# Patient Record
Sex: FEMALE | Race: WHITE
[De-identification: names, ages, dates, MRNs, and addresses within clinical notes are randomized per-mention and may not be internally consistent; named-entity substitution may affect disease eponyms.]

---

## 2017-01-19 ENCOUNTER — HOSPITAL ENCOUNTER (EMERGENCY)
Dept: HOSPITAL 25 - UCCORT | Age: 42
Discharge: HOME | End: 2017-01-19
Payer: COMMERCIAL

## 2017-01-19 VITALS — DIASTOLIC BLOOD PRESSURE: 77 MMHG | SYSTOLIC BLOOD PRESSURE: 109 MMHG

## 2017-01-19 DIAGNOSIS — Z87.440: Primary | ICD-10-CM

## 2017-01-19 DIAGNOSIS — Z88.0: ICD-10-CM

## 2017-01-19 PROCEDURE — 87086 URINE CULTURE/COLONY COUNT: CPT

## 2017-01-19 PROCEDURE — 99212 OFFICE O/P EST SF 10 MIN: CPT

## 2017-01-19 PROCEDURE — 87077 CULTURE AEROBIC IDENTIFY: CPT

## 2017-01-19 PROCEDURE — G0463 HOSPITAL OUTPT CLINIC VISIT: HCPCS

## 2017-01-19 PROCEDURE — 87798 DETECT AGENT NOS DNA AMP: CPT

## 2017-01-19 PROCEDURE — 87186 SC STD MICRODIL/AGAR DIL: CPT

## 2017-01-19 NOTE — UC
Complaint Female HPI





- HPI Summary


HPI Summary: 





dysuria, inc frequency, urgency, suprapubic pressure for 3d. Intolerable today. 

No vomiting or fever. Feels like prior UTI





- History Of Current Complaint


Chief Complaint: UCGU


Stated Complaint: UTI COMPLAINT


Time Seen by Provider: 01/19/17 11:26


Hx Obtained From: Patient


Hx Last Menstrual Period: 1/9/18


Onset/Duration: Gradual Onset, Lasting Days - 3


Timing: Constant


Severity Initially: Mild


Severity Currently: Moderate


Character: Sharp, Cramping


Aggravating Factor(s): Urination


Alleviating Factor(s): Nothing


Associated Signs And Symptoms: Positive: Negative


Related Hx: Similar Episode/Dx as: - UTI





- Risk Factors


Ectopic Pregnancy Risk Factor: Negative


Ovarian Torsion Risk Factor: Negative





- Allergies/Home Medications


Allergies/Adverse Reactions: 


 Allergies











Allergy/AdvReac Type Severity Reaction Status Date / Time


 


Amoxicillin Allergy Intermediate Rash Verified 01/19/17 11:27











Home Medications: 


 Home Medications





Cranberry-Vitamin C [Azo Cranbery Urinary Trac 250-60 mg] 1 cap PO BID PRN 01/19 /17 [History Confirmed 01/19/17]


Diphenhydramine HCl [Benadryl Allergy] 50 mg PO DAILY PRN 01/19/17 [History 

Confirmed 01/19/17]


Magnesium 500 mg PO DAILY 01/19/17 [History Confirmed 01/19/17]











PMH/Surg Hx/FS Hx/Imm Hx


Previously Healthy: Yes


Endocrine History Of: 


   Denies: Diabetes


Cardiovascular History Of: 


   Denies: Hypertension, Pacemaker/ICD


Respiratory History Of: 


   Denies: Asthma





- Surgical History


Surgical History: None





- Family History


Known Family History: 


   Negative: Seizure Disorder, Blood Disorder





- Social History


Occupation: Employed Full-time - health teacher


Lives: With Family


Alcohol Use: Daily


Alcohol Amount: wine


Substance Use Type: None


Smoking Status (MU): Never Smoked Tobacco





- Immunization History


Most Recent Tetanus Shot: within past 5 years





Review of Systems


Constitutional: Negative


Skin: Negative


Eyes: Negative


ENT: Negative


Respiratory: Negative


Cardiovascular: Negative


Gastrointestinal: Negative


Genitourinary: Dysuria, Frequency, Urgency


Motor: Negative


Neurovascular: Negative


Musculoskeletal: Negative


Neurological: Negative


Psychological: Negative


All Other Systems Reviewed And Are Negative: Yes





Physical Exam


Triage Information Reviewed: Yes


Appearance: Well-Appearing, No Pain Distress, Well-Nourished


Vital Signs: 


 Initial Vital Signs











Temp  98.2 F   01/19/17 11:29


 


Pulse  86   01/19/17 11:29


 


Resp  16   01/19/17 11:29


 


BP  109/77   01/19/17 11:29


 


Pulse Ox  98   01/19/17 11:29











Vital Signs Reviewed: Yes


Eye Exam: Normal


Neck exam: Normal


Respiratory Exam: Normal


Cardiovascular Exam: Normal


Abdomen Description: Positive: Other: - suprapubic tenderness


Musculoskeletal Exam: Normal


Neurological Exam: Normal


Psychological Exam: Normal


Skin Exam: Normal





Diagnostics





- Laboratory


Diagnostic Studies Completed/Ordered: U/A dip positive leuks





 Complaint Female Dx





- Differential Dx/Diagnosis


Differential Diagnosis/HQI/PQRI: Urinary Tract Infection


Provider Diagnoses: UTI





Discharge





- Discharge Plan


Condition: Stable


Disposition: HOME


Prescriptions: 


DOXYcycline CAP(*) [DOXYcycline 100MG CAP(*)] 100 mg PO BID #20 cap


Phenazopyridine HCl [Pyridium] 200 mg PO TID PRN #6 tab


 PRN Reason: pain with urination


Patient Education Materials:  Urinary Tract Infection in Women (ED)


Referrals: 


No Primary Care Phys,NOPCP [Primary Care Provider] -

## 2017-01-23 LAB
SPECIMEN SOURCE: (no result)
SPECIMEN SOURCE: (no result)

## 2017-08-17 ENCOUNTER — HOSPITAL ENCOUNTER (EMERGENCY)
Dept: HOSPITAL 25 - UCCORT | Age: 42
Discharge: HOME | End: 2017-08-17
Payer: COMMERCIAL

## 2017-08-17 VITALS — DIASTOLIC BLOOD PRESSURE: 85 MMHG | SYSTOLIC BLOOD PRESSURE: 125 MMHG

## 2017-08-17 DIAGNOSIS — N39.0: Primary | ICD-10-CM

## 2017-08-17 DIAGNOSIS — Z32.02: ICD-10-CM

## 2017-08-17 DIAGNOSIS — Z88.1: ICD-10-CM

## 2017-08-17 PROCEDURE — 81003 URINALYSIS AUTO W/O SCOPE: CPT

## 2017-08-17 PROCEDURE — 99212 OFFICE O/P EST SF 10 MIN: CPT

## 2017-08-17 PROCEDURE — 87086 URINE CULTURE/COLONY COUNT: CPT

## 2017-08-17 PROCEDURE — 84702 CHORIONIC GONADOTROPIN TEST: CPT

## 2017-08-17 PROCEDURE — G0463 HOSPITAL OUTPT CLINIC VISIT: HCPCS

## 2017-08-17 NOTE — UC
Complaint Female HPI





- HPI Summary


HPI Summary: 





FREQUENCY BURNING PRESSURE AND URGENCY WITH URINATION SINCE THIS MORNING. NO 

BACK PAIN. NO FEVER. 





- History Of Current Complaint


Chief Complaint: UCGU


Stated Complaint: URINARY COMPLAINT


Time Seen by Provider: 08/17/17 10:37


Hx Obtained From: Patient


Hx Last Menstrual Period: 7/25/17


Onset/Duration: Gradual Onset, Lasting Days, Still Present


Timing: Intermittent


Severity Initially: Mild


Severity Currently: Mild


Pain Intensity: 0


Pain Scale Used: 0-10 Numeric


Character: Burning


Aggravating Factor(s): Urination


Associated Signs And Symptoms: Negative: Fever, Back Pain, Vaginal Bleeding/

Discharge, Vaginal Discharge, Nausea, Vomiting(# Of Episodes =)





- Risk Factors


Ectopic Pregnancy Risk Factor: Negative


Ovarian Torsion Risk Factor: Negative





- Allergies/Home Medications


Allergies/Adverse Reactions: 


 Allergies











Allergy/AdvReac Type Severity Reaction Status Date / Time


 


Amoxicillin Allergy Intermediate Rash Verified 08/17/17 09:41














PMH/Surg Hx/FS Hx/Imm Hx


Previously Healthy: Yes





- Surgical History


Surgical History: None





- Family History


Known Family History: 


   Negative: Renal Disease, Seizure Disorder, Blood Disorder





- Social History


Occupation: Employed Full-time


Lives: With Family


Alcohol Use: Daily


Alcohol Amount: wine


Substance Use Type: None


Smoking Status (MU): Never Smoked Tobacco





- Immunization History


Most Recent Tetanus Shot: within past 5 years





Review of Systems


Constitutional: Negative


Skin: Negative


Eyes: Negative


ENT: Negative


Respiratory: Negative


Cardiovascular: Negative


Gastrointestinal: Negative


Genitourinary: Dysuria, Frequency, Urgency


Motor: Negative


Neurovascular: Negative


Musculoskeletal: Negative


Neurological: Negative


Psychological: Negative


All Other Systems Reviewed And Are Negative: Yes





Physical Exam


Triage Information Reviewed: Yes


Appearance: Well-Appearing, No Pain Distress, Well-Nourished


Vital Signs: 


 Initial Vital Signs











Temp  97.9 F   08/17/17 09:43


 


Pulse  76   08/17/17 09:43


 


Resp  18   08/17/17 09:43


 


BP  125/85   08/17/17 09:43


 


Pulse Ox  100   08/17/17 09:43











Vital Signs Reviewed: Yes


Eye Exam: Normal


ENT Exam: Normal


ENT: Positive: Normal ENT inspection, Hearing grossly normal, TMs normal


Dental Exam: Normal


Neck exam: Normal


Neck: Positive: Supple, Nontender, No Lymphadenopathy


Respiratory Exam: Normal


Respiratory: Positive: Chest non-tender, Lungs clear, Normal breath sounds, No 

respiratory distress, No accessory muscle use


Cardiovascular Exam: Normal


Cardiovascular: Positive: RRR, No Murmur, Pulses Normal


Abdominal Exam: Normal


Abdomen Description: Positive: Nontender, No Organomegaly


Musculoskeletal Exam: Normal


Musculoskeletal: Positive: Strength Intact, ROM Intact


Neurological Exam: Normal


Psychological Exam: Normal


Skin Exam: Normal





 Complaint Female Dx





- Differential Dx/Diagnosis


Differential Diagnosis/HQI/PQRI: Cervicitis, Renal Colic, Urinary Tract 

Infection


Provider Diagnoses: URINARY TRACT INFECTION





Discharge





- Discharge Plan


Condition: Stable


Disposition: HOME


Prescriptions: 


Phenazopyridine TAB* [Pyridium 100 mg TAB*] 100 mg PO TID PRN #15 tab


 PRN Reason: Pain


Sulfamethox/Trimethoprim DS* [Bactrim /160 TAB*] 1 tab PO BID #10 tab


Patient Education Materials:  Urinary Tract Infection in Women (ED)


Referrals: 


Theodore RUFF,Patricia DOSS [Primary Care Provider] -

## 2017-08-19 NOTE — UC
Progress





- Progress Note


Progress Note: 


urine cx shows few enterbacteria, possible contamination. there is not 

significant grwoth to show infection. stop abx, she was put on bactrim.

## 2018-02-06 ENCOUNTER — HOSPITAL ENCOUNTER (EMERGENCY)
Dept: HOSPITAL 25 - UCCORT | Age: 43
Discharge: HOME | End: 2018-02-06
Payer: COMMERCIAL

## 2018-02-06 VITALS — DIASTOLIC BLOOD PRESSURE: 85 MMHG | SYSTOLIC BLOOD PRESSURE: 129 MMHG

## 2018-02-06 DIAGNOSIS — N39.0: Primary | ICD-10-CM

## 2018-02-06 DIAGNOSIS — Z32.02: ICD-10-CM

## 2018-02-06 DIAGNOSIS — B96.20: ICD-10-CM

## 2018-02-06 DIAGNOSIS — Z87.898: ICD-10-CM

## 2018-02-06 PROCEDURE — 87186 SC STD MICRODIL/AGAR DIL: CPT

## 2018-02-06 PROCEDURE — 99212 OFFICE O/P EST SF 10 MIN: CPT

## 2018-02-06 PROCEDURE — 87077 CULTURE AEROBIC IDENTIFY: CPT

## 2018-02-06 PROCEDURE — 81003 URINALYSIS AUTO W/O SCOPE: CPT

## 2018-02-06 PROCEDURE — 84702 CHORIONIC GONADOTROPIN TEST: CPT

## 2018-02-06 PROCEDURE — G0463 HOSPITAL OUTPT CLINIC VISIT: HCPCS

## 2018-02-06 PROCEDURE — 87086 URINE CULTURE/COLONY COUNT: CPT

## 2018-02-06 NOTE — UC
Complaint Female HPI





- HPI Summary


HPI Summary: 





41 y/o female presents to the urgent care c/o frequency and burning on 

urinations for the past 24 hours. Pt states recurrent UTI last year. Pt took 1 

tab of Azo this morning. Pt denies pelvic pain, lower back pain, vaginal 

discharge, fever, abdominal pain, N/V/D. Hx of uterine ablation in 6/2017. 








- History Of Current Complaint


Hx Obtained From: Patient


Hx Last Menstrual Period: 7/25/17


Onset/Duration: Gradual Onset, Lasting Days - 1 day, Still Present, Worse Since 

- this morning


Timing: Intermittent


Severity Initially: Mild


Severity Currently: Mild


Pain Intensity: 1


Pain Scale Used: 0-10 Numeric


Character: Burning


Aggravating Factor(s): Urination


Alleviating Factor(s): Nothing


Associated Signs And Symptoms: Positive: Negative.  Negative: Fever, Back Pain, 

Vaginal Bleeding/Discharge, Genital Swelling





- Risk Factors


Ectopic Pregnancy Risk Factor: Negative


Ovarian Torsion Risk Factor: Negative





<Jasmin Faith - Last Filed: 02/07/18 18:00>





<Blanca Sullivan - Last Filed: 02/08/18 12:39>





- History Of Current Complaint


Chief Complaint: UCGU


Stated Complaint: URINARY


Time Seen by Provider: 02/06/18 15:06





- Allergies/Home Medications


Allergies/Adverse Reactions: 


 Allergies











Allergy/AdvReac Type Severity Reaction Status Date / Time


 


MS Amoxicillin [Amoxicillin] Allergy Intermediate Rash Verified 02/06/18 14:54














PMH/Surg Hx/FS Hx/Imm Hx


Previously Healthy: Yes - Pt denies PMHX





- Surgical History


Surgical History: None





- Family History


Known Family History: 


   Negative: Renal Disease, Seizure Disorder, Blood Disorder





- Social History


Alcohol Use: Daily


Alcohol Amount: wine


Substance Use Type: None


Smoking Status (MU): Never Smoked Tobacco





- Immunization History


Most Recent Tetanus Shot: within past 5 years





<Jasmin Faith - Last Filed: 02/07/18 18:00>





Review of Systems


Constitutional: Negative


Skin: Negative


Eyes: Negative


ENT: Negative


Respiratory: Negative


Cardiovascular: Negative


Gastrointestinal: Negative


Genitourinary: Dysuria, Frequency, Urgency


Motor: Negative


Neurovascular: Negative


Musculoskeletal: Negative


Neurological: Negative


Psychological: Negative


Is Patient Immunocompromised?: No


All Other Systems Reviewed And Are Negative: Yes





<Jasmin Faith - Last Filed: 02/07/18 18:00>





Physical Exam


Triage Information Reviewed: Yes


Vital Signs: 


 Initial Vital Signs











Temp  98.0 F   02/06/18 14:50


 


Pulse  73   02/06/18 14:50


 


Resp  16   02/06/18 14:50


 


BP  129/85   02/06/18 14:50


 


Pulse Ox  100   02/06/18 14:50














- Additional Comments





VITAL SIGNS: Reviewed. 


GENERAL:  Patient is a well developed and nourished female who is sitting 

comfortable in the examining table.  Patient is not in any acute respiratory 

distress. 


HEAD AND FACE: No signs of trauma.  No ecchymosis, hematomas or skull 

depressions. No sinus tenderness. 


EYES: PERRLA, EOMI x 2, No injected conjunctiva, clear watery eyes, no 

nystagmus. No photophobia.


EARS: Hearing grossly intact. Ear canals and tympanic membranes are within 

normal limits. 


MOUTH: pharynx with no erythema, no exudates,no  palatal petechiae. no B/L 

tonsillar enlargement  Uvula in midline. 


NECK: Supple, trachea is midline, no lymphadenopathy, no JVD, no carotid bruit, 

no c-spine tenderness, neck with full ROM. 


CHEST: Symmetric, no tenderness at palpation 


LUNGS: Clear to auscultation bilaterally. No wheezing or crackles.


CVS: Regular rate and rhythm, S1 and S2 present, no murmurs or gallops 

appreciated. 


ABDOMEN: Soft, non-tender. No signs of distention. No rebound no guarding, and 

no masses palpated. Bowel sounds are normal. 


BACK:no scoliosis or lesions, non tender to palpation, No B/L CVA tenderness


EXTREMITIES: FROM in all major joints, no edema, no cyanosis or clubbing.


NEURO: Alert and oriented x 3. No acute neurological deficits. Speech is normal 

and follows commands. 


SKIN: Dry and warm 











<Jasmin Faith - Last Filed: 02/07/18 18:00>


Vital Signs: 


 Initial Vital Signs











Temp  98.0 F   02/06/18 14:50


 


Pulse  73   02/06/18 14:50


 


Resp  16   02/06/18 14:50


 


BP  129/85   02/06/18 14:50


 


Pulse Ox  100   02/06/18 14:50














<Blanca Sullivan - Last Filed: 02/08/18 12:39>





 Complaint Female Dx





- Course


Course Of Treatment: 41 y/o female presents to the urgent care c/o frequency 

and burning on urinations for the past 24 hours. Pt states recurrent UTI last 

year. Pt took 1 tab of Azo this morning. Pt denies pelvic pain, lower back pain

, vaginal discharge, fever, abdominal pain, N/V/D. Hx of uterine ablation in 6/ 2017. Hx obtained. PE; WNL. UA: +leukoesteraces. UA pregnancy:negative. Urine 

sent for culture to r/o any abnormality since PT took 1 tab of Azo this 

morning. Pt Rx Macrobid 100mg PO x 7 days. Pyridium 100mg PO TID x 2 days. 

Advised to increase fluid intake. Pt will be notified of urine culture for 

further treatment. Pt advised  If symptoms do not improve to return to the 

urgent care or f/u with PCP.  Pt understood and agreed.





- Differential Dx/Diagnosis


Differential Diagnosis/HQI/PQRI: Cervicitis, Pelvic Inflammatory Disease, Renal 

Colic, Ureteral Stone, Urinary Tract Infection


Provider Diagnoses: 1- UTI.  2-Dysuria





<Jasmin Faith - Last Filed: 02/07/18 18:00>





Discharge





<Jasmin Faith - Last Filed: 02/07/18 18:00>





<Blanca Sullivan - Last Filed: 02/08/18 12:39>





- Discharge Plan


Condition: Stable


Disposition: HOME


Prescriptions: 


Nitrofurantoin Macrocrystals* [Macrodantin*] 100 mg PO BID #14 cap


Phenazopyridine TAB* [Pyridium 100 mg TAB*] 100 mg PO TID #6 tab


Patient Education Materials:  Urinary Tract Infection in Women (ED)


Referrals: 


Theodore RUFF,Patricia DOSS [Primary Care Provider] - 3 Days


Additional Instructions: 


1- Please take Macrobid 100mg PO x 7 days. Pyridium 100 mg PO TID x 2 days to 

alleviate urinary symptoms. Increase increase fluid intake. drink cranberry 

juice.


2-Urine sent for culture if any abnormality, you will be notified for further 

treatment.


3-If symptoms do not improve please return to the urgent care or f/u with her 

PCP. 











Attestation Statement


User Type: Provider - I was available for consult. This patient was seen by the 

CATE. The patient was not presented to, seen by, or examined by me. Lynda





<Blanca Sullivan - Last Filed: 02/08/18 12:39>

## 2018-02-10 ENCOUNTER — HOSPITAL ENCOUNTER (EMERGENCY)
Dept: HOSPITAL 25 - UCCORT | Age: 43
Discharge: HOME | End: 2018-02-10
Payer: COMMERCIAL

## 2018-02-10 VITALS — SYSTOLIC BLOOD PRESSURE: 122 MMHG | DIASTOLIC BLOOD PRESSURE: 76 MMHG

## 2018-02-10 DIAGNOSIS — Z51.89: Primary | ICD-10-CM

## 2018-02-10 DIAGNOSIS — N39.0: ICD-10-CM

## 2018-02-10 PROCEDURE — G0463 HOSPITAL OUTPT CLINIC VISIT: HCPCS

## 2018-02-10 PROCEDURE — 99212 OFFICE O/P EST SF 10 MIN: CPT

## 2018-02-10 NOTE — UC
Complaint Female HPI





- HPI Summary


HPI Summary: 





states seen tuesday  for a uti and tx macrobid. the frequent urination resolved 

but pt still has urgency with hesitation and a little burning plus some low 

back ache and "bladder discomfort". denies abdominal pain, fever, n/v, vaginal d

/c, risk/concern and symptoms of pelvic infection. she does note a little blood 

in the urine. states not pregnant, had an ablation plus "implants". denies 

abdominal pain.





- History Of Current Complaint


Chief Complaint: UCGU


Stated Complaint: RECHECK UTI -  NO IMPROVEMENT


Time Seen by Provider: 02/10/18 16:26


Hx Obtained From: Patient


Hx Last Menstrual Period: 7/25/17


Onset/Duration: Gradual Onset


Timing: Constant


Severity Initially: Mild


Pain Intensity: 3


Character: Burning


Aggravating Factor(s): Nothing


Associated Signs And Symptoms: Negative: Fever, Vaginal Bleeding/Discharge, 

Vaginal Discharge, Nausea





- Allergies/Home Medications


Allergies/Adverse Reactions: 


 Allergies











Allergy/AdvReac Type Severity Reaction Status Date / Time


 


MS Amoxicillin [Amoxicillin] Allergy Intermediate Rash Verified 02/10/18 15:50














PMH/Surg Hx/FS Hx/Imm Hx


Previously Healthy: Yes





- Surgical History


Surgical History: None





- Family History


Known Family History: 


   Negative: Renal Disease, Seizure Disorder, Blood Disorder





- Social History


Alcohol Use: Daily


Alcohol Amount: wine


Substance Use Type: None


Smoking Status (MU): Never Smoked Tobacco





- Immunization History


Most Recent Tetanus Shot: within past 5 years





Review of Systems


Constitutional: Negative


Skin: Negative


Eyes: Negative


ENT: Negative


Respiratory: Negative


Cardiovascular: Negative


Gastrointestinal: Negative


Genitourinary: Dysuria, Hematuria, Urgency


Is Patient Immunocompromised?: No


All Other Systems Reviewed And Are Negative: Yes





Physical Exam


Triage Information Reviewed: Yes


Appearance: Well-Appearing


Vital Signs: 


 Initial Vital Signs











Temp  99.9 F   02/10/18 15:47


 


Pulse  88   02/10/18 15:47


 


Resp  16   02/10/18 15:47


 


BP  122/76   02/10/18 15:47


 


Pulse Ox  98   02/10/18 15:47











Vital Signs Reviewed: Yes


Eye Exam: Normal


ENT Exam: Normal


Neck: Positive: Supple, Nontender, No Lymphadenopathy


Respiratory: Positive: Lungs clear, Normal breath sounds


Cardiovascular: Positive: RRR, No Murmur


Abdomen Description: Positive: Nontender, No Organomegaly, Soft.  Negative: CVA 

Tenderness (R), CVA Tenderness (L), Distended, Guarding


Bowel Sounds: Positive: Present


Neurological: Positive: Alert


Psychological Exam: Normal


Skin Exam: Normal





 Complaint Female Dx





- Course


Course Of Treatment: non toxic, no acute abdomen. not c/w renal colic, culture 

was + for e coli and sensitive to macrobid. pt adament no pelvic s/s's or 

pathology. uterine ablation plus nimplant thus no concern for pregnancy. repeat 

u/a d/c as pt took pyridium which she had denied on initial hx. d/w dr rossi, 

will change class of antibiotic and refer to her pcp for f/u this monday.





- Differential Dx/Diagnosis


Provider Diagnoses: uti





Discharge





- Discharge Plan


Condition: Stable


Disposition: HOME


Prescriptions: 


Cephalexin CAP* [Keflex CAP*] 500 mg PO BID 7 Days #14 cap


Patient Education Materials:  Urinary Tract Infection in Women (DC)


Referrals: 


Theodore RUFF,Patricia DOSS [Primary Care Provider] - 2 Days


Additional Instructions: 


STOP THE MACROBID

## 2019-02-28 ENCOUNTER — HOSPITAL ENCOUNTER (EMERGENCY)
Dept: HOSPITAL 25 - UCCORT | Age: 44
Discharge: HOME | End: 2019-02-28
Payer: COMMERCIAL

## 2019-02-28 VITALS — SYSTOLIC BLOOD PRESSURE: 125 MMHG | DIASTOLIC BLOOD PRESSURE: 77 MMHG

## 2019-02-28 DIAGNOSIS — J01.90: Primary | ICD-10-CM

## 2019-02-28 DIAGNOSIS — B96.89: ICD-10-CM

## 2019-02-28 DIAGNOSIS — Z88.0: ICD-10-CM

## 2019-02-28 PROCEDURE — 99212 OFFICE O/P EST SF 10 MIN: CPT

## 2019-02-28 PROCEDURE — G0463 HOSPITAL OUTPT CLINIC VISIT: HCPCS

## 2019-02-28 NOTE — UC
Respiratory Complaint HPI





- HPI Summary


HPI Summary: 





Ms. Sullivan presented with nearly 3 weeks of URI symptoms.  It started 

typically with nasal congestion and some sinus pain for about a week.  It moved 

down into her chest and she was coughing and that has also now resolved.  Now 

she has began to get frontal pain and congestion sometimes radiating to the 

occiput for the last week or so.  She denies any fever or chills.  She's been 

using Sudafed and other over-the-counter medications for at least a week.





- History of Current Complaint


Chief Complaint: UCRespiratory


Stated Complaint: SINUS HA


Time Seen by Provider: 02/28/19 08:20


Hx Obtained From: Patient


Hx Last Menstrual Period: 7/25/17


Onset/Duration: Gradual Onset


Timing: Constant


Severity Initially: Mild


Severity Currently: Moderate


Pain Intensity: 5


Associated Signs And Symptoms: Positive: Negative





- Allergies/Home Medications


Allergies/Adverse Reactions: 


 Allergies











Allergy/AdvReac Type Severity Reaction Status Date / Time


 


amoxicillin Allergy  Rash Verified 02/28/19 08:05











Home Medications: 


 Home Medications





guaiFENesin ER TAB [Mucinex*] 600 mg PO BID PRN 02/28/19 [History Confirmed 02/ 28/19]











PMH/Surg Hx/FS Hx/Imm Hx


Previously Healthy: Yes





- Surgical History


Surgical History: Yes


Surgery Procedure, Year, and Place: ablation.  R knee arthroscopy





- Family History


Known Family History: 


   Negative: Renal Disease, Seizure Disorder, Blood Disorder





- Social History


Alcohol Use: Daily


Alcohol Amount: wine


Substance Use Type: None


Smoking Status (MU): Never Smoked Tobacco





- Immunization History


Most Recent Tetanus Shot: within past 5 years





Review of Systems


All Other Systems Reviewed And Are Negative: Yes


Constitutional: Positive: Negative


Eyes: Positive: Negative


ENT: Positive: Sinus Congestion, Sinus Pain/Tenderness


Respiratory: Positive: Negative


Neurological: Positive: Headache





Physical Exam





- Summary


Physical Exam Summary: 





She is nontoxic in appearance with stable vital signs


Triage Information Reviewed: Yes


Appearance: Well-Appearing


Vital Signs: 


 Initial Vital Signs











Temp  97.9 F   02/28/19 08:06


 


Pulse  90   02/28/19 08:06


 


Resp  17   02/28/19 08:06


 


BP  125/77   02/28/19 08:06


 


Pulse Ox  100   02/28/19 08:06











ENT: Positive: Sinus tenderness - right side does not transilluminate well


Neck exam: Normal


Neck: Positive: Supple, Nontender, No Lymphadenopathy


Respiratory: Positive: Lungs clear


Cardiovascular: Positive: RRR





UC Diagnostic Evaluation





- Laboratory


O2 Sat by Pulse Oximetry: 100





Respiratory Course/Dx





- Course


Course Of Treatment: It sounds as though Ms. Sullivan started with a viral URI 

and now has a secondary bacterial sinus infection.  She's been using 

decongestants and I encouraged her to continue and will treat with levoquin.





- Differential Dx/Diagnosis


Provider Diagnosis: 


 Sinusitis, acute








Discharge





- Sign-Out/Discharge


Documenting (check all that apply): Patient Departure


All imaging exams completed and their final reports reviewed: No Studies





- Discharge Plan


Condition: Stable


Disposition: HOME


Patient Education Materials:  Sinusitis (ED)


Referrals: 


Theodore RUFF,Patricia DOSS [Primary Care Provider] - 





- Billing Disposition and Condition


Condition: STABLE


Disposition: Home